# Patient Record
Sex: MALE | Race: BLACK OR AFRICAN AMERICAN | NOT HISPANIC OR LATINO | Employment: UNEMPLOYED | RURAL
[De-identification: names, ages, dates, MRNs, and addresses within clinical notes are randomized per-mention and may not be internally consistent; named-entity substitution may affect disease eponyms.]

---

## 2022-07-05 ENCOUNTER — HOSPITAL ENCOUNTER (EMERGENCY)
Facility: HOSPITAL | Age: 42
Discharge: HOME | End: 2022-07-05
Attending: EMERGENCY MEDICINE
Payer: MEDICARE

## 2022-07-05 VITALS
TEMPERATURE: 98 F | BODY MASS INDEX: 18.78 KG/M2 | SYSTOLIC BLOOD PRESSURE: 168 MMHG | DIASTOLIC BLOOD PRESSURE: 109 MMHG | OXYGEN SATURATION: 100 % | HEIGHT: 63 IN | RESPIRATION RATE: 18 BRPM | WEIGHT: 106 LBS | HEART RATE: 64 BPM
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
## 2022-07-05 DIAGNOSIS — S46.812A STRAIN OF LEFT TRAPEZIUS MUSCLE, INITIAL ENCOUNTER: ICD-10-CM

## 2022-07-05 DIAGNOSIS — S16.1XXA STRAIN OF NECK MUSCLE, INITIAL ENCOUNTER: ICD-10-CM

## 2022-07-05 PROCEDURE — 99283 PR EMERGENCY DEPT VISIT,LEVEL III: ICD-10-PCS | Mod: ,,, | Performed by: EMERGENCY MEDICINE

## 2022-07-05 PROCEDURE — 99283 EMERGENCY DEPT VISIT LOW MDM: CPT | Mod: ,,, | Performed by: EMERGENCY MEDICINE

## 2022-07-05 PROCEDURE — 99283 EMERGENCY DEPT VISIT LOW MDM: CPT

## 2022-07-05 RX ORDER — DICLOFENAC SODIUM 50 MG/1
50 TABLET, DELAYED RELEASE ORAL 3 TIMES DAILY
Qty: 30 TABLET | Refills: 0 | Status: SHIPPED | OUTPATIENT
Start: 2022-07-05 | End: 2022-09-30

## 2022-07-05 NOTE — ED PROVIDER NOTES
Encounter Date: 7/5/2022       History     Chief Complaint   Patient presents with    Neck Pain     Patient is a 42-year-old male who presents to the emergency department complaining of pain to left lateral neck and left upper back after he accidentally fell off the back of a stationary 4 dejesus last night.  Patient states he landed flat on his back.  Patient did not notice really hurting at that time although he admits he had been drinking a lot of alcohol.  Patient is here today complaining of pain and stiffness.  Patient denies any radiation of pain, weakness, numbness, or bowel or bladder incontinence.  Patient denies any midline pain.        Review of patient's allergies indicates:  No Known Allergies  Past Medical History:   Diagnosis Date    Hypertension      History reviewed. No pertinent surgical history.  History reviewed. No pertinent family history.  Social History     Tobacco Use    Smoking status: Current Every Day Smoker     Types: Cigarettes    Smokeless tobacco: Never Used   Substance Use Topics    Alcohol use: Yes    Drug use: Yes     Types: Marijuana     Review of Systems   Musculoskeletal: Positive for back pain and neck pain.   All other systems reviewed and are negative.      Physical Exam     Initial Vitals [07/05/22 1113]   BP Pulse Resp Temp SpO2   (!) 168/109 64 18 98.4 °F (36.9 °C) 100 %      MAP       --         Physical Exam    Nursing note and vitals reviewed.  Constitutional: He appears well-developed and well-nourished.   HENT:   Head: Normocephalic and atraumatic.   Mouth/Throat: Oropharynx is clear and moist.   Eyes: Pupils are equal, round, and reactive to light.   Neck: Neck supple.   Normal range of motion.  Cardiovascular: Normal rate and regular rhythm.   Pulmonary/Chest: Effort normal and breath sounds normal.   Abdominal: Abdomen is soft. He exhibits no distension.   Musculoskeletal:         General: Normal range of motion.      Cervical back: Normal range of motion  and neck supple.      Comments: Patient has mild tenderness to palpation laterally on the left side of his neck.  There is no midline tenderness.  Patient has good range of motion without pain.  Patient only reports pain when he rotates his head to the left and this pain is mild.  Patient has mild diffuse tenderness to palpation involving the left trapezius region.  There is no midline tenderness of the upper back.  Neurovascular intact all extremities.     Neurological: He is alert.   Skin: Skin is warm. Capillary refill takes less than 2 seconds.   Psychiatric: He has a normal mood and affect.         Medical Screening Exam   See Full Note    ED Course   Procedures  Labs Reviewed - No data to display       Imaging Results    None          Medications - No data to display                    Clinical Impression:   Final diagnoses:  [W19.XXXA] Fall, initial encounter (Primary)  [S16.1XXA] Strain of neck muscle, initial encounter  [S46.812A] Strain of left trapezius muscle, initial encounter          ED Disposition Condition    Discharge Stable        ED Prescriptions     Medication Sig Dispense Start Date End Date Auth. Provider    diclofenac (VOLTAREN) 50 MG EC tablet Take 1 tablet (50 mg total) by mouth 3 (three) times daily. P.r.n. pain 30 tablet 7/5/2022  Jean Martin MD        Follow-up Information    None          Jean Martin MD  07/05/22 1148       Jean Martin MD  07/05/22 1140

## 2022-07-05 NOTE — DISCHARGE INSTRUCTIONS
Take medication as prescribed.  Return to emergency department for any worsening or further problems.  Follow up in clinic with primary care provider in 2-3 days if symptoms persist.

## 2022-07-05 NOTE — ED TRIAGE NOTES
Patient presents with neck pain that started after he fell backwards off a 4 dejesus while trying to get off. Occurred last night at [id number] 4 dejesus was not moving. No LOC.

## 2022-08-18 ENCOUNTER — OFFICE VISIT (OUTPATIENT)
Dept: GASTROENTEROLOGY | Facility: CLINIC | Age: 42
End: 2022-08-18
Payer: MEDICARE

## 2022-08-18 VITALS
HEART RATE: 84 BPM | SYSTOLIC BLOOD PRESSURE: 138 MMHG | HEIGHT: 63 IN | DIASTOLIC BLOOD PRESSURE: 97 MMHG | OXYGEN SATURATION: 99 % | WEIGHT: 108 LBS | BODY MASS INDEX: 19.14 KG/M2

## 2022-08-18 DIAGNOSIS — R10.13 EPIGASTRIC ABDOMINAL PAIN: ICD-10-CM

## 2022-08-18 DIAGNOSIS — D50.0 IRON DEFICIENCY ANEMIA DUE TO CHRONIC BLOOD LOSS: Primary | ICD-10-CM

## 2022-08-18 DIAGNOSIS — R10.13 EPIGASTRIC PAIN: ICD-10-CM

## 2022-08-18 DIAGNOSIS — D50.9 IRON DEFICIENCY ANEMIA, UNSPECIFIED IRON DEFICIENCY ANEMIA TYPE: ICD-10-CM

## 2022-08-18 PROCEDURE — 99204 OFFICE O/P NEW MOD 45 MIN: CPT | Mod: ,,, | Performed by: INTERNAL MEDICINE

## 2022-08-18 PROCEDURE — 99204 PR OFFICE/OUTPT VISIT, NEW, LEVL IV, 45-59 MIN: ICD-10-PCS | Mod: ,,, | Performed by: INTERNAL MEDICINE

## 2022-08-18 RX ORDER — LISINOPRIL 5 MG/1
5 TABLET ORAL DAILY
COMMUNITY
Start: 2022-03-17

## 2022-08-18 RX ORDER — AMLODIPINE BESYLATE 10 MG/1
10 TABLET ORAL DAILY
COMMUNITY
Start: 2022-03-17

## 2022-08-18 RX ORDER — POLYETHYLENE GLYCOL 3350, SODIUM SULFATE ANHYDROUS, SODIUM BICARBONATE, SODIUM CHLORIDE, POTASSIUM CHLORIDE 236; 22.74; 6.74; 5.86; 2.97 G/4L; G/4L; G/4L; G/4L; G/4L
4 POWDER, FOR SOLUTION ORAL ONCE
Qty: 4000 ML | Refills: 0 | Status: SHIPPED | OUTPATIENT
Start: 2022-08-18 | End: 2022-08-18

## 2022-08-18 NOTE — PROGRESS NOTES
Gastroenterology Clinic Note  Patient ID: [id number]   Referring MD: No ref. provider found   Chief Complaint:   Chief Complaint   Patient presents with    Anemia       History of Present Illness   Amita Mcneil is an 42 y.o. AAM who is referred for evaluation of iron deficiency anemia and epigastric abdominal pain. He reports PCP found him to have CECELIA on routine labs at his clinic visit. He denies overt GI bleeding, hematuria. Denies weight loss, dysphagia, illicit drugs, diarrhea, blood in stool. He had a colonoscopy in 2008 when his mother was diagnosed with colon cancer - he has not had any further endoscopy since then and doesn't recall results. He is a daily smoker and regularly drinks alcohol - 15-20 beers/week and 3oz liquor daily. Also complains of non-radiating epigastric/LUQ pain 2-3x/month that occasionally may be worsened after meals and described as burning; no odynophagia or NSAID use.     Past Medical History      Past Medical History:   Diagnosis Date    Hypertension        Past Surgical History     Past Surgical History:   Procedure Laterality Date    COLONOSCOPY  2008       Allergies   Review of patient's allergies indicates:  No Known Allergies    Immunization History     There is no immunization history on file for this patient.    Past Family History    History reviewed. No pertinent family history.    Past Social History      Social History     Socioeconomic History    Marital status: Single   Tobacco Use    Smoking status: Current Every Day Smoker     Types: Cigarettes    Smokeless tobacco: Never Used   Substance and Sexual Activity    Alcohol use: Yes     Alcohol/week: 21.0 standard drinks     Types: 15 Cans of beer, 6 Shots of liquor per week    Drug use: Yes     Frequency: 3.0 times per week     Types: Marijuana       Current Medications     Outpatient Medications Marked as Taking for the 8/18/22 encounter (Office Visit) with Joaquin Jerome   Medication Sig Dispense Refill     "amLODIPine (NORVASC) 10 MG tablet Take 10 mg by mouth once daily.      lisinopriL (PRINIVIL,ZESTRIL) 5 MG tablet Take 5 mg by mouth once daily.          I have reviewed the current medications, allergies, vital signs, past medical and surgical history, family medical history, and social history for this encounter and agree with all findings.    Review of Systems   Review of Systems   Constitutional: Negative for weight loss.   Gastrointestinal: Positive for abdominal pain. Negative for blood in stool, constipation, diarrhea, heartburn, melena and nausea.   All other systems reviewed and are negative.      OBJECTIVE    Physical Exam    BP (!) 138/97   Pulse 84   Ht 5' 3" (1.6 m)   Wt 49 kg (108 lb)   SpO2 99%   BMI 19.13 kg/m²   Body mass index is 19.13 kg/m².  GEN: well appearing, thin, cooperative, NAD  HEENT: NCAT, OP benign, MMM  NECK: Supple, no LAD  CV: normal rate, regular rhythm, no m/r/g  RESP: CTA bilaterally, unlabored, no wheezes/crackles/rhonchi  ABD: NABS, ND, NT, soft, no guarding  EXT: No clubbing, cyanosis, or edema.  SKIN: Warm and dry; no rashes, lesions, or ulcers  NEURO: AAO x4. Afocal.    LABS    CBC (with or without Differential): No results found for: WBC, HGB, HCT, MCV, MCH, MCHC, RDW, PLT, MPV, NEUTROPCT, NEUTOPHILPCT, MONOPCT, EOSPCT, BASOPCT, DIFFTYPE  BMP/CMP: No results found for: NA, K, CL, CO2, BUN, CREATININE, LABCREA, GLU, CALCIUM, ALB, ALBUMIN, PHOSPHORUS, AST, ALT, ALKPHOS, MG       ASSESSMENT  Amita Mcneil is a 42 y.o.  AAM with +FMH CRC (mother), iron deficiency anemia, and epigastric abdominal pain with chronic smoking/alcohol history.    1. Iron deficiency anemia due to chronic blood loss    2. Epigastric abdominal pain    3. Epigastric pain    4. Iron deficiency anemia, unspecified iron deficiency anemia type         PLAN  - schedule patient for EGD/Colonoscopy for evaluation of CECELIA and abdominal pain  - if no etiology for the above is found, will consider further " evaluation with abdominal imaging (eg pancreas)  - will need colonoscopy q5 years given family history       The risks and benefits of my recommendations, as well as other treatment options were discussed with the patient today. All questions were answered.      Joaquin Jerome MD  Gastroenterology

## 2022-08-25 ENCOUNTER — APPOINTMENT (OUTPATIENT)
Dept: LAB | Facility: CLINIC | Age: 42
End: 2022-08-25
Payer: MEDICARE

## 2022-08-25 DIAGNOSIS — Z11.52 ENCOUNTER FOR SCREENING FOR COVID-19: Primary | ICD-10-CM

## 2022-08-25 LAB
CTP QC/QA: YES
SARS-COV-2 AG RESP QL IA.RAPID: NEGATIVE

## 2022-08-25 PROCEDURE — 87426 SARSCOV CORONAVIRUS AG IA: CPT | Mod: RHCUB | Performed by: NURSE PRACTITIONER

## 2022-08-26 ENCOUNTER — ANESTHESIA (OUTPATIENT)
Dept: GASTROENTEROLOGY | Facility: HOSPITAL | Age: 42
End: 2022-08-26
Payer: MEDICARE

## 2022-08-26 ENCOUNTER — HOSPITAL ENCOUNTER (OUTPATIENT)
Dept: GASTROENTEROLOGY | Facility: HOSPITAL | Age: 42
Discharge: HOME | End: 2022-08-26
Attending: INTERNAL MEDICINE
Payer: MEDICARE

## 2022-08-26 ENCOUNTER — ANESTHESIA EVENT (OUTPATIENT)
Dept: GASTROENTEROLOGY | Facility: HOSPITAL | Age: 42
End: 2022-08-26
Payer: MEDICARE

## 2022-08-26 VITALS
BODY MASS INDEX: 18.96 KG/M2 | WEIGHT: 107 LBS | SYSTOLIC BLOOD PRESSURE: 126 MMHG | DIASTOLIC BLOOD PRESSURE: 86 MMHG | RESPIRATION RATE: 15 BRPM | HEIGHT: 63 IN | TEMPERATURE: 98 F | HEART RATE: 71 BPM | OXYGEN SATURATION: 100 %

## 2022-08-26 DIAGNOSIS — D50.0 IRON DEFICIENCY ANEMIA DUE TO CHRONIC BLOOD LOSS: ICD-10-CM

## 2022-08-26 DIAGNOSIS — R10.13 EPIGASTRIC ABDOMINAL PAIN: ICD-10-CM

## 2022-08-26 PROCEDURE — 27201423 OPTIME MED/SURG SUP & DEVICES STERILE SUPPLY

## 2022-08-26 PROCEDURE — D9220A PRA ANESTHESIA: Mod: ,,, | Performed by: NURSE ANESTHETIST, CERTIFIED REGISTERED

## 2022-08-26 PROCEDURE — 43239 EGD BIOPSY SINGLE/MULTIPLE: CPT | Mod: 51,,, | Performed by: INTERNAL MEDICINE

## 2022-08-26 PROCEDURE — 43239 EGD BIOPSY SINGLE/MULTIPLE: CPT

## 2022-08-26 PROCEDURE — D9220A PRA ANESTHESIA: ICD-10-PCS | Mod: ,,, | Performed by: NURSE ANESTHETIST, CERTIFIED REGISTERED

## 2022-08-26 PROCEDURE — 45378 DIAGNOSTIC COLONOSCOPY: CPT | Mod: ,,, | Performed by: INTERNAL MEDICINE

## 2022-08-26 PROCEDURE — 45378 DIAGNOSTIC COLONOSCOPY: CPT

## 2022-08-26 PROCEDURE — 43239 PR EGD, FLEX, W/BIOPSY, SGL/MULTI: ICD-10-PCS | Mod: 51,,, | Performed by: INTERNAL MEDICINE

## 2022-08-26 PROCEDURE — 37000008 HC ANESTHESIA 1ST 15 MINUTES

## 2022-08-26 PROCEDURE — 37000009 HC ANESTHESIA EA ADD 15 MINS

## 2022-08-26 PROCEDURE — 63600175 PHARM REV CODE 636 W HCPCS: Performed by: NURSE ANESTHETIST, CERTIFIED REGISTERED

## 2022-08-26 PROCEDURE — 25000003 PHARM REV CODE 250: Performed by: NURSE ANESTHETIST, CERTIFIED REGISTERED

## 2022-08-26 PROCEDURE — 45378 PR COLONOSCOPY,DIAGNOSTIC: ICD-10-PCS | Mod: ,,, | Performed by: INTERNAL MEDICINE

## 2022-08-26 RX ORDER — PROPOFOL 10 MG/ML
VIAL (ML) INTRAVENOUS
Status: DISCONTINUED | OUTPATIENT
Start: 2022-08-26 | End: 2022-08-26

## 2022-08-26 RX ORDER — LIDOCAINE HYDROCHLORIDE 20 MG/ML
INJECTION, SOLUTION EPIDURAL; INFILTRATION; INTRACAUDAL; PERINEURAL
Status: DISCONTINUED | OUTPATIENT
Start: 2022-08-26 | End: 2022-08-26

## 2022-08-26 RX ADMIN — PROPOFOL 40 MG: 10 INJECTION, EMULSION INTRAVENOUS at 08:08

## 2022-08-26 RX ADMIN — SODIUM CHLORIDE: 9 INJECTION, SOLUTION INTRAVENOUS at 07:08

## 2022-08-26 RX ADMIN — PROPOFOL 80 MG: 10 INJECTION, EMULSION INTRAVENOUS at 07:08

## 2022-08-26 RX ADMIN — LIDOCAINE HYDROCHLORIDE 80 MG: 20 INJECTION, SOLUTION INTRAVENOUS at 07:08

## 2022-08-26 NOTE — DISCHARGE INSTRUCTIONS
Procedure Date  8/26/22     Impression  Overall Impression:   - mild gastritis, biopsied for H pylori  - Z-line 40-cm  - the esophagus and duodenum were normal  - proceed with colonoscopy today     Recommendation  Proceed with Colonoscopy today     Indication  Epigastric abdominal pain, Iron deficiency anemia due to chronic blood loss       Procedure Date  8/26/22     Impression  Overall Impression:   - Small internal hemorrhoids  - The colon and terminal ileum were otherwise normal  - No polyps, AVMs. Erythema, ulcerations, or evidence of bleeding     Recommendation  - Repeat colonoscopy in 5 years with adult colonoscope; +FMH CRC in mother  - Recommend CT Abdomen/Pelvis with IV contrast given EGD/Colonoscopy without etiology for epigastric abdominal pain  - Recommend a trial of iron replacement per PCP  - If patient develops recurrent or refractory CECELIA following iron supplementation, consider capsule endoscopy     Indication  Iron deficiency anemia due to chronic blood loss, abdominal pain

## 2022-08-26 NOTE — H&P
"Gastroenterology Pre-procedure H&P    History of Present Illness    Amita Mcneil is a 42 y.o. male that  has a past medical history of Hypertension. Patient presents for EGD/Colon for abdominal pain, CECELIA, +FMH CRC in mother.    Patient denies N/V/D, wt loss, hematemesis, abdominal pain, melena/hematochezia, change in bowel habits, no anticoagulants.       Past Medical History:   Diagnosis Date    Hypertension        Past Surgical History:   Procedure Laterality Date    COLONOSCOPY  2008       History reviewed. No pertinent family history.    Social History     Socioeconomic History    Marital status: Single   Tobacco Use    Smoking status: Current Every Day Smoker     Packs/day: 1.00     Types: Cigarettes    Smokeless tobacco: Never Used   Substance and Sexual Activity    Alcohol use: Yes     Alcohol/week: 21.0 standard drinks     Types: 15 Cans of beer, 6 Shots of liquor per week    Drug use: Yes     Frequency: 3.0 times per week     Types: Marijuana       Current Outpatient Medications   Medication Sig Dispense Refill    amLODIPine (NORVASC) 10 MG tablet Take 10 mg by mouth once daily.      diclofenac (VOLTAREN) 50 MG EC tablet Take 1 tablet (50 mg total) by mouth 3 (three) times daily. P.r.n. pain (Patient not taking: Reported on 8/18/2022) 30 tablet 0    lisinopriL (PRINIVIL,ZESTRIL) 5 MG tablet Take 5 mg by mouth once daily.       No current facility-administered medications for this encounter.       Review of patient's allergies indicates:  No Known Allergies    Objective:  Vitals:    08/26/22 0718 08/26/22 0728   Pulse:  71   Resp:  14   Temp: 97.5 °F (36.4 °C)    SpO2:  98%   Weight: 48.5 kg (107 lb)    Height: 5' 3" (1.6 m)         GEN: normal appearing, NAD, AAO x3  HENT: NCAT, anicteric, OP benign  CV: normal rate, regular rhythm  RESP: NABS, symmetric rise, unlabored  ABD: soft, ND, no guarding or TTP  SKIN: warm and dry  NEURO: grossly afocal    Assessment and Plan:    Proceed with:    EGD " for epigastric abdominal pain & CECELIA  Colonoscopy for iron deficiency anemia, +FMH CRC    Joaquin Jerome MD  Gastroenterology

## 2022-08-26 NOTE — TRANSFER OF CARE
"Anesthesia Transfer of Care Note    Patient: Amita Mcneil    Procedure(s) Performed: * No procedures listed *    Patient location: Other: [facility]    Anesthesia Type: general    Transport from OR: Transported from OR on room air with adequate spontaneous ventilation    Post pain: adequate analgesia    Post assessment: no apparent anesthetic complications    Post vital signs: stable    Level of consciousness: sedated and responds to stimulation    Nausea/Vomiting: no nausea/vomiting    Complications: none    Transfer of care protocol was followedComments: Good SV continue, NAD noted, VSS, RTRN      Last vitals:   Visit Vitals  /86 (BP Location: Right arm, Patient Position: Lying)   Pulse 71   Temp 36.4 °C (97.6 °F) (Oral)   Resp 15   Ht 5' 3" (1.6 m)   Wt 48.5 kg (107 lb)   SpO2 100%   BMI 18.95 kg/m²     "

## 2022-08-26 NOTE — ANESTHESIA PREPROCEDURE EVALUATION
08/26/2022  Amita Mcneil is a 42 y.o., male.      Pre-op Assessment    I have reviewed the Patient Summary Reports.     I have reviewed the Nursing Notes. I have reviewed the NPO Status.   I have reviewed the Medications.     Review of Systems  Anesthesia Hx:  History of prior surgery of interest to airway management or planning: Denies Family Hx of Anesthesia complications.   Denies Personal Hx of Anesthesia complications.   Social:  Smoker, Social Alcohol Use    Cardiovascular:   Hypertension        Physical Exam  General: Well nourished    Airway:  Mallampati: II   Mouth Opening: Normal  TM Distance: Normal, at least 6 cm  Tongue: Normal  Neck ROM: Normal ROM        Anesthesia Plan  Type of Anesthesia, risks & benefits discussed:    Anesthesia Type: Gen Natural Airway  Intra-op Monitoring Plan: Standard ASA Monitors  Post Op Pain Control Plan: multimodal analgesia  Induction:  IV  Informed Consent: Informed consent signed with the Patient and all parties understand the risks and agree with anesthesia plan.  All questions answered. Patient consented to blood products? No  ASA Score: 2  Day of Surgery Review of History & Physical: H&P Update referred to the surgeon/provider.I have interviewed and examined the patient. I have reviewed the patient's H&P dated: There are no significant changes.     Ready For Surgery From Anesthesia Perspective.     .   Active Ambulatory Problems     Diagnosis Date Noted    Epigastric pain 08/18/2022    Iron deficiency anemia 08/18/2022     Resolved Ambulatory Problems     Diagnosis Date Noted    No Resolved Ambulatory Problems     Past Medical History:   Diagnosis Date    Hypertension    Review of patient's allergies indicates:  No Known Allergies   Active Ambulatory Problems     Diagnosis Date Noted    Epigastric pain 08/18/2022    Iron deficiency anemia 08/18/2022      Resolved Ambulatory Problems     Diagnosis Date Noted    No Resolved Ambulatory Problems     Past Medical History:   Diagnosis Date    Hypertension

## 2022-08-26 NOTE — ANESTHESIA POSTPROCEDURE EVALUATION
Anesthesia Post Evaluation    Patient: Amita Mcneil    Procedure(s) Performed: * No procedures listed *    Final Anesthesia Type: general      Patient location: [facility].  Patient participation: Yes- Able to Participate  Level of consciousness: awake and alert  Post-procedure vital signs: reviewed and stable  Pain management: adequate  Airway patency: patent    PONV status at discharge: No PONV  Anesthetic complications: no      Cardiovascular status: blood pressure returned to baseline, hemodynamically stable and stable  Respiratory status: unassisted  Hydration status: euvolemic  Follow-up not needed.  Comments: Pt voices appreciation for care          Vitals Value Taken Time   /106 08/26/22 0846   Temp 36.4 °C (97.6 °F) 08/26/22 0821   Pulse 72 08/26/22 0846   Resp 14 08/26/22 0846   SpO2 100 % 08/26/22 0846   Vitals shown include unvalidated device data.      No case tracking events are documented in the log.      Pain/Cody Score: Cody Score: 8 (8/26/2022  8:19 AM)

## 2022-08-29 LAB
ESTROGEN SERPL-MCNC: NORMAL PG/ML
INSULIN SERPL-ACNC: NORMAL U[IU]/ML
LAB AP GROSS DESCRIPTION: NORMAL
LAB AP LABORATORY NOTES: NORMAL
T3RU NFR SERPL: NORMAL %

## 2022-09-30 ENCOUNTER — OFFICE VISIT (OUTPATIENT)
Dept: FAMILY MEDICINE | Facility: CLINIC | Age: 42
End: 2022-09-30
Payer: MEDICARE

## 2022-09-30 VITALS
TEMPERATURE: 98 F | DIASTOLIC BLOOD PRESSURE: 86 MMHG | RESPIRATION RATE: 20 BRPM | WEIGHT: 106 LBS | SYSTOLIC BLOOD PRESSURE: 118 MMHG | HEART RATE: 84 BPM | HEIGHT: 63 IN | BODY MASS INDEX: 18.78 KG/M2 | OXYGEN SATURATION: 98 %

## 2022-09-30 DIAGNOSIS — Z20.2 CHLAMYDIA CONTACT: Primary | ICD-10-CM

## 2022-09-30 DIAGNOSIS — Z72.51 HIGH RISK SEXUAL BEHAVIOR, UNSPECIFIED TYPE: ICD-10-CM

## 2022-09-30 PROCEDURE — 87591 CHLAMYDIA/GONORRHOEAE(GC), PCR: ICD-10-PCS | Mod: ,,, | Performed by: CLINICAL MEDICAL LABORATORY

## 2022-09-30 PROCEDURE — 99202 PR OFFICE/OUTPT VISIT, NEW, LEVL II, 15-29 MIN: ICD-10-PCS | Mod: ,,, | Performed by: NURSE PRACTITIONER

## 2022-09-30 PROCEDURE — 87491 CHLAMYDIA/GONORRHOEAE(GC), PCR: ICD-10-PCS | Mod: ,,, | Performed by: CLINICAL MEDICAL LABORATORY

## 2022-09-30 PROCEDURE — 87591 N.GONORRHOEAE DNA AMP PROB: CPT | Mod: ,,, | Performed by: CLINICAL MEDICAL LABORATORY

## 2022-09-30 PROCEDURE — 99202 OFFICE O/P NEW SF 15 MIN: CPT | Mod: ,,, | Performed by: NURSE PRACTITIONER

## 2022-09-30 PROCEDURE — 87491 CHLMYD TRACH DNA AMP PROBE: CPT | Mod: ,,, | Performed by: CLINICAL MEDICAL LABORATORY

## 2022-09-30 RX ORDER — DOXYCYCLINE 100 MG/1
100 CAPSULE ORAL 2 TIMES DAILY
Qty: 14 CAPSULE | Refills: 0 | Status: SHIPPED | OUTPATIENT
Start: 2022-09-30

## 2022-09-30 NOTE — PROGRESS NOTES
"New clinic note    Amita Mcneil is a 42 y.o. male     Chief Complaint:   Chief Complaint   Patient presents with    Exposure to STD     chlamydia         Subjective:    Patient states his partner tested positive for chlamydia. Patient admits to sexual intercourse without any type of protection. Patient denies any symptoms. Denies dysuria.     Exposure to STD  The patient's pertinent negatives include no penile discharge or penile pain. Pertinent negatives include no dysuria, fever, flank pain or frequency.      Allergies:   Review of patient's allergies indicates:  No Known Allergies     Past Medical History:  Past Medical History:   Diagnosis Date    Hypertension         Current Medications:    Current Outpatient Medications:     amLODIPine (NORVASC) 10 MG tablet, Take 10 mg by mouth once daily., Disp: , Rfl:     lisinopriL (PRINIVIL,ZESTRIL) 5 MG tablet, Take 5 mg by mouth once daily., Disp: , Rfl:     doxycycline (VIBRAMYCIN) 100 MG Cap, Take 1 capsule (100 mg total) by mouth 2 (two) times daily., Disp: 14 capsule, Rfl: 0       Review of Systems   Constitutional:  Negative for fever.   Genitourinary:  Negative for discharge, dysuria, flank pain, frequency and penile pain.        Objective:    /86 (BP Location: Left arm, Patient Position: Sitting, BP Method: Large (Manual))   Pulse 84   Temp 97.6 °F (36.4 °C) (Temporal)   Resp 20   Ht 5' 3" (1.6 m)   Wt 48.1 kg (106 lb)   SpO2 98%   BMI 18.78 kg/m²      Physical Exam  Constitutional:       Appearance: Normal appearance.   Eyes:      Extraocular Movements: Extraocular movements intact.   Cardiovascular:      Rate and Rhythm: Normal rate and regular rhythm.      Pulses: Normal pulses.      Heart sounds: Normal heart sounds.   Pulmonary:      Effort: Pulmonary effort is normal.      Breath sounds: Normal breath sounds.   Abdominal:      Palpations: Abdomen is soft.      Tenderness: There is no abdominal tenderness.   Neurological:      Mental Status: " He is alert and oriented to person, place, and time.        Assessment and Plan:    1. Chlamydia contact    2. High risk sexual behavior, unspecified type         Chlamydia contact  -     Chlamydia/GC, PCR; Future; Expected date: 09/30/2022  -     doxycycline (VIBRAMYCIN) 100 MG Cap; Take 1 capsule (100 mg total) by mouth 2 (two) times daily.  Dispense: 14 capsule; Refill: 0    High risk sexual behavior, unspecified type  -     Chlamydia/GC, PCR; Future; Expected date: 09/30/2022     -Will start doxycycline due to partner with confirmed positive  -encourage patient to abstain from sexual intercourse until treatment completed  -discussed STD and encouraged safe sex practices    There are no Patient Instructions on file for this visit.   Follow up if symptoms worsen or fail to improve.

## 2022-10-01 LAB
CHLAMYDIA BY PCR: NEGATIVE
N. GONORRHOEAE (GC) BY PCR: NEGATIVE